# Patient Record
Sex: MALE | Race: WHITE | NOT HISPANIC OR LATINO | Employment: FULL TIME | URBAN - METROPOLITAN AREA
[De-identification: names, ages, dates, MRNs, and addresses within clinical notes are randomized per-mention and may not be internally consistent; named-entity substitution may affect disease eponyms.]

---

## 2024-04-22 ENCOUNTER — APPOINTMENT (EMERGENCY)
Dept: RADIOLOGY | Facility: HOSPITAL | Age: 64
End: 2024-04-22
Payer: COMMERCIAL

## 2024-04-22 ENCOUNTER — HOSPITAL ENCOUNTER (EMERGENCY)
Facility: HOSPITAL | Age: 64
Discharge: HOME | End: 2024-04-23
Attending: EMERGENCY MEDICINE
Payer: COMMERCIAL

## 2024-04-22 DIAGNOSIS — R07.9 CHEST PAIN: Primary | ICD-10-CM

## 2024-04-22 DIAGNOSIS — K04.7 DENTAL INFECTION: ICD-10-CM

## 2024-04-22 DIAGNOSIS — I10 HYPERTENSION: ICD-10-CM

## 2024-04-22 LAB
ALBUMIN SERPL BCP-MCNC: 4.6 G/DL (ref 3.5–5)
ALP SERPL-CCNC: 56 U/L (ref 34–104)
ALT SERPL W P-5'-P-CCNC: 19 U/L (ref 7–52)
ANION GAP SERPL CALCULATED.3IONS-SCNC: 11 MMOL/L (ref 4–13)
AST SERPL W P-5'-P-CCNC: 14 U/L (ref 13–39)
BASOPHILS # BLD AUTO: 0.04 THOUSANDS/ÂΜL (ref 0–0.1)
BASOPHILS NFR BLD AUTO: 0 % (ref 0–1)
BILIRUB SERPL-MCNC: 2.48 MG/DL (ref 0.2–1)
BNP SERPL-MCNC: 13 PG/ML (ref 0–100)
BUN SERPL-MCNC: 16 MG/DL (ref 5–25)
CALCIUM SERPL-MCNC: 9.5 MG/DL (ref 8.4–10.2)
CARDIAC TROPONIN I PNL SERPL HS: 3 NG/L
CHLORIDE SERPL-SCNC: 102 MMOL/L (ref 96–108)
CO2 SERPL-SCNC: 29 MMOL/L (ref 21–32)
CREAT SERPL-MCNC: 1.37 MG/DL (ref 0.6–1.3)
EOSINOPHIL # BLD AUTO: 0.1 THOUSAND/ÂΜL (ref 0–0.61)
EOSINOPHIL NFR BLD AUTO: 1 % (ref 0–6)
ERYTHROCYTE [DISTWIDTH] IN BLOOD BY AUTOMATED COUNT: 13.3 % (ref 11.6–15.1)
GFR SERPL CREATININE-BSD FRML MDRD: 54 ML/MIN/1.73SQ M
GLUCOSE SERPL-MCNC: 118 MG/DL (ref 65–140)
HCT VFR BLD AUTO: 49.4 % (ref 36.5–49.3)
HGB BLD-MCNC: 16.3 G/DL (ref 12–17)
IMM GRANULOCYTES # BLD AUTO: 0.04 THOUSAND/UL (ref 0–0.2)
IMM GRANULOCYTES NFR BLD AUTO: 0 % (ref 0–2)
LYMPHOCYTES # BLD AUTO: 2.26 THOUSANDS/ÂΜL (ref 0.6–4.47)
LYMPHOCYTES NFR BLD AUTO: 18 % (ref 14–44)
MAGNESIUM SERPL-MCNC: 2.4 MG/DL (ref 1.9–2.7)
MCH RBC QN AUTO: 30.2 PG (ref 26.8–34.3)
MCHC RBC AUTO-ENTMCNC: 33 G/DL (ref 31.4–37.4)
MCV RBC AUTO: 92 FL (ref 82–98)
MONOCYTES # BLD AUTO: 1.34 THOUSAND/ÂΜL (ref 0.17–1.22)
MONOCYTES NFR BLD AUTO: 11 % (ref 4–12)
NEUTROPHILS # BLD AUTO: 8.51 THOUSANDS/ÂΜL (ref 1.85–7.62)
NEUTS SEG NFR BLD AUTO: 70 % (ref 43–75)
NRBC BLD AUTO-RTO: 0 /100 WBCS
PLATELET # BLD AUTO: 227 THOUSANDS/UL (ref 149–390)
PMV BLD AUTO: 9.9 FL (ref 8.9–12.7)
POTASSIUM SERPL-SCNC: 3.6 MMOL/L (ref 3.5–5.3)
PROT SERPL-MCNC: 7.7 G/DL (ref 6.4–8.4)
RBC # BLD AUTO: 5.39 MILLION/UL (ref 3.88–5.62)
SODIUM SERPL-SCNC: 142 MMOL/L (ref 135–147)
WBC # BLD AUTO: 12.29 THOUSAND/UL (ref 4.31–10.16)

## 2024-04-22 PROCEDURE — 99285 EMERGENCY DEPT VISIT HI MDM: CPT

## 2024-04-22 PROCEDURE — 83735 ASSAY OF MAGNESIUM: CPT | Performed by: EMERGENCY MEDICINE

## 2024-04-22 PROCEDURE — 93005 ELECTROCARDIOGRAM TRACING: CPT

## 2024-04-22 PROCEDURE — 96374 THER/PROPH/DIAG INJ IV PUSH: CPT

## 2024-04-22 PROCEDURE — 84484 ASSAY OF TROPONIN QUANT: CPT | Performed by: EMERGENCY MEDICINE

## 2024-04-22 PROCEDURE — 83880 ASSAY OF NATRIURETIC PEPTIDE: CPT | Performed by: EMERGENCY MEDICINE

## 2024-04-22 PROCEDURE — 80053 COMPREHEN METABOLIC PANEL: CPT | Performed by: EMERGENCY MEDICINE

## 2024-04-22 PROCEDURE — 71045 X-RAY EXAM CHEST 1 VIEW: CPT

## 2024-04-22 PROCEDURE — 85025 COMPLETE CBC W/AUTO DIFF WBC: CPT | Performed by: EMERGENCY MEDICINE

## 2024-04-22 PROCEDURE — 96375 TX/PRO/DX INJ NEW DRUG ADDON: CPT

## 2024-04-22 PROCEDURE — 36415 COLL VENOUS BLD VENIPUNCTURE: CPT | Performed by: EMERGENCY MEDICINE

## 2024-04-22 RX ORDER — AMLODIPINE BESYLATE 5 MG/1
5 TABLET ORAL ONCE
Status: COMPLETED | OUTPATIENT
Start: 2024-04-22 | End: 2024-04-22

## 2024-04-22 RX ORDER — HYDROMORPHONE HCL/PF 1 MG/ML
0.5 SYRINGE (ML) INJECTION ONCE
Status: COMPLETED | OUTPATIENT
Start: 2024-04-22 | End: 2024-04-22

## 2024-04-22 RX ORDER — AMOXICILLIN 250 MG/1
500 CAPSULE ORAL ONCE
Status: COMPLETED | OUTPATIENT
Start: 2024-04-22 | End: 2024-04-22

## 2024-04-22 RX ORDER — MORPHINE SULFATE 4 MG/ML
4 INJECTION, SOLUTION INTRAMUSCULAR; INTRAVENOUS ONCE
Status: COMPLETED | OUTPATIENT
Start: 2024-04-22 | End: 2024-04-22

## 2024-04-22 RX ADMIN — HYDROMORPHONE HYDROCHLORIDE 0.5 MG: 1 INJECTION, SOLUTION INTRAMUSCULAR; INTRAVENOUS; SUBCUTANEOUS at 23:29

## 2024-04-22 RX ADMIN — AMLODIPINE BESYLATE 5 MG: 5 TABLET ORAL at 22:57

## 2024-04-22 RX ADMIN — AMOXICILLIN 500 MG: 250 CAPSULE ORAL at 22:57

## 2024-04-22 RX ADMIN — MORPHINE SULFATE 4 MG: 4 INJECTION INTRAVENOUS at 22:13

## 2024-04-23 VITALS
TEMPERATURE: 99.5 F | SYSTOLIC BLOOD PRESSURE: 200 MMHG | BODY MASS INDEX: 32.7 KG/M2 | OXYGEN SATURATION: 93 % | RESPIRATION RATE: 19 BRPM | WEIGHT: 208.34 LBS | HEART RATE: 102 BPM | DIASTOLIC BLOOD PRESSURE: 105 MMHG | HEIGHT: 67 IN

## 2024-04-23 LAB
2HR DELTA HS TROPONIN: 0 NG/L
ATRIAL RATE: 88 BPM
CARDIAC TROPONIN I PNL SERPL HS: 3 NG/L
P AXIS: 52 DEGREES
PR INTERVAL: 164 MS
QRS AXIS: -21 DEGREES
QRSD INTERVAL: 78 MS
QT INTERVAL: 372 MS
QTC INTERVAL: 450 MS
T WAVE AXIS: 43 DEGREES
VENTRICULAR RATE: 88 BPM

## 2024-04-23 PROCEDURE — 84484 ASSAY OF TROPONIN QUANT: CPT | Performed by: EMERGENCY MEDICINE

## 2024-04-23 PROCEDURE — 99285 EMERGENCY DEPT VISIT HI MDM: CPT | Performed by: EMERGENCY MEDICINE

## 2024-04-23 PROCEDURE — 93010 ELECTROCARDIOGRAM REPORT: CPT | Performed by: INTERNAL MEDICINE

## 2024-04-23 PROCEDURE — 36415 COLL VENOUS BLD VENIPUNCTURE: CPT | Performed by: EMERGENCY MEDICINE

## 2024-04-23 RX ORDER — AMLODIPINE BESYLATE 5 MG/1
5 TABLET ORAL DAILY
Qty: 30 TABLET | Refills: 0 | Status: SHIPPED | OUTPATIENT
Start: 2024-04-23 | End: 2024-05-23

## 2024-04-23 RX ORDER — AMOXICILLIN 500 MG/1
500 CAPSULE ORAL 3 TIMES DAILY
Qty: 21 CAPSULE | Refills: 0 | Status: SHIPPED | OUTPATIENT
Start: 2024-04-23 | End: 2024-04-30

## 2024-04-23 RX ORDER — DIPHENHYDRAMINE HYDROCHLORIDE AND LIDOCAINE HYDROCHLORIDE AND ALUMINUM HYDROXIDE AND MAGNESIUM HYDRO
10 KIT ONCE
Status: COMPLETED | OUTPATIENT
Start: 2024-04-23 | End: 2024-04-23

## 2024-04-23 RX ADMIN — DIPHENHYDRAMINE HYDROCHLORIDE AND LIDOCAINE HYDROCHLORIDE AND ALUMINUM HYDROXIDE AND MAGNESIUM HYDRO 10 ML: KIT at 01:07

## 2024-04-23 NOTE — ED NOTES
Pt states he takes 800mg Ibuprophen twice a day. Once in the morning and once at night.     Anay Patton RN  04/22/24 [id number]

## 2024-04-23 NOTE — ED NOTES
Spoke with provider regarding discharging patient with current blood pressures. Provider explained that since this has been long term and untreated, the blood pressure would need to resolve slowly over days and is sending patient home with medications to do so. Provider feels safe discharging patient at this time. Pt is asymptomatic and feels well enough to be discharged.      Anay Patton RN  04/23/24 0054

## 2024-04-23 NOTE — ED PROVIDER NOTES
History  Chief Complaint   Patient presents with    Chest Pain     Intermittent x days, nausea, L lower dental pain, had dental work done Friday in [place]     63-year-old male history of hypertension recently started on telmisartan presents with complaint of intermittent chest pain for the past week, high blood pressure, and dental pain.  Patient is a  and lives in [place].  He recently went to [place] with the intent of having dental work however when he arrived his blood pressure was Quite high so they refused to do any procedures on him and prescribed him a new blood pressure medicine.  Since then he continues to have dental pain affecting all of his right lower front teeth which has been consistently painful for some time related to tooth decay.  He denies prior courses of antibiotics.  No fevers or chills.  No new swelling.        None       Past Medical History:   Diagnosis Date    Hypertension        Past Surgical History:   Procedure Laterality Date    APPENDECTOMY      CHOLECYSTECTOMY         History reviewed. No pertinent family history.  I have reviewed and agree with the history as documented.    E-Cigarette/Vaping    E-Cigarette Use Never User      E-Cigarette/Vaping Substances     Social History     Tobacco Use    Smoking status: Former     Types: Cigarettes     Passive exposure: Never    Smokeless tobacco: Never   Vaping Use    Vaping status: Never Used   Substance Use Topics    Alcohol use: Not Currently    Drug use: Not Currently       Review of Systems   All other systems reviewed and are negative.      Physical Exam  Physical Exam  Constitutional:       General: He is not in acute distress.  HENT:      Mouth/Throat:      Mouth: Mucous membranes are moist.   Eyes:      Conjunctiva/sclera: Conjunctivae normal.   Cardiovascular:      Rate and Rhythm: Normal rate and regular rhythm.   Pulmonary:      Effort: Pulmonary effort is normal.      Breath sounds: Normal breath sounds.   Abdominal:       Palpations: Abdomen is soft.      Tenderness: There is no abdominal tenderness.   Musculoskeletal:      Right lower leg: No edema.      Left lower leg: No edema.   Skin:     General: Skin is warm and dry.   Neurological:      General: No focal deficit present.      Mental Status: He is alert and oriented to person, place, and time.   Psychiatric:         Mood and Affect: Mood normal.         Behavior: Behavior normal.         Vital Signs  ED Triage Vitals [04/22/24 2135]   Temperature Pulse Respirations Blood Pressure SpO2   99.3 °F (37.4 °C) 91 18 (!) 228/114 95 %      Temp Source Heart Rate Source Patient Position - Orthostatic VS BP Location FiO2 (%)   Oral Monitor Lying Left arm --      Pain Score       10 - Worst Possible Pain           Vitals:    04/22/24 2300 04/22/24 2315 04/22/24 2330 04/23/24 0000   BP: (!) 244/119 (!) 224/104 (!) 218/109 (!) 217/110   Pulse: 89 91 90 99   Patient Position - Orthostatic VS: Lying Lying Lying Lying         Visual Acuity      ED Medications  Medications   morphine injection 4 mg (4 mg Intravenous Given 4/22/24 2213)   amLODIPine (NORVASC) tablet 5 mg (5 mg Oral Given 4/22/24 2257)   amoxicillin (AMOXIL) capsule 500 mg (500 mg Oral Given 4/22/24 2257)   HYDROmorphone (DILAUDID) injection 0.5 mg (0.5 mg Intravenous Given 4/22/24 2329)       Diagnostic Studies  Results Reviewed       Procedure Component Value Units Date/Time    HS Troponin I 2hr [[id number]] Collected: 04/23/24 0000    Lab Status: In process Specimen: Blood from Arm, Right Updated: 04/23/24 0005    HS Troponin I 4hr [[id number]]     Lab Status: No result Specimen: Blood     B-Type Natriuretic Peptide(BNP) [[id number]]  (Normal) Collected: 04/22/24 2201    Lab Status: Final result Specimen: Blood from Arm, Right Updated: 04/22/24 2241     BNP 13 pg/mL     HS Troponin 0hr (reflex protocol) [[id number]]  (Normal) Collected: 04/22/24 2201    Lab Status: Final result Specimen: Blood from Arm, Right Updated: 04/22/24  2236     hs TnI 0hr 3 ng/L     Comprehensive metabolic panel [[id number]]  (Abnormal) Collected: 04/22/24 2201    Lab Status: Final result Specimen: Blood from Arm, Right Updated: 04/22/24 2231     Sodium 142 mmol/L      Potassium 3.6 mmol/L      Chloride 102 mmol/L      CO2 29 mmol/L      ANION GAP 11 mmol/L      BUN 16 mg/dL      Creatinine 1.37 mg/dL      Glucose 118 mg/dL      Calcium 9.5 mg/dL      AST 14 U/L      ALT 19 U/L      Alkaline Phosphatase 56 U/L      Total Protein 7.7 g/dL      Albumin 4.6 g/dL      Total Bilirubin 2.48 mg/dL      eGFR 54 ml/min/1.73sq m     Narrative:      [employer] guidelines for Chronic Kidney Disease (CKD):     Stage 1 with normal or high GFR (GFR > 90 mL/min/1.73 square meters)    Stage 2 Mild CKD (GFR = 60-89 mL/min/1.73 square meters)    Stage 3A Moderate CKD (GFR = 45-59 mL/min/1.73 square meters)    Stage 3B Moderate CKD (GFR = 30-44 mL/min/1.73 square meters)    Stage 4 Severe CKD (GFR = 15-29 mL/min/1.73 square meters)    Stage 5 End Stage CKD (GFR <15 mL/min/1.73 square meters)  Note: GFR calculation is accurate only with a steady state creatinine    Magnesium [[id number]]  (Normal) Collected: 04/22/24 2201    Lab Status: Final result Specimen: Blood from Arm, Right Updated: 04/22/24 2231     Magnesium 2.4 mg/dL     CBC and differential [[id number]]  (Abnormal) Collected: 04/22/24 2201    Lab Status: Final result Specimen: Blood from Arm, Right Updated: 04/22/24 2214     WBC 12.29 Thousand/uL      RBC 5.39 Million/uL      Hemoglobin 16.3 g/dL      Hematocrit 49.4 %      MCV 92 fL      MCH 30.2 pg      MCHC 33.0 g/dL      RDW 13.3 %      MPV 9.9 fL      Platelets 227 Thousands/uL      nRBC 0 /100 WBCs      Segmented % 70 %      Immature Grans % 0 %      Lymphocytes % 18 %      Monocytes % 11 %      Eosinophils Relative 1 %      Basophils Relative 0 %      Absolute Neutrophils 8.51 Thousands/µL      Absolute Immature Grans 0.04 Thousand/uL       Absolute Lymphocytes 2.26 Thousands/µL      Absolute Monocytes 1.34 Thousand/µL      Eosinophils Absolute 0.10 Thousand/µL      Basophils Absolute 0.04 Thousands/µL                    XR chest 1 view portable    (Results Pending)              Procedures  Procedures         ED Course         63-year-old male history of hypertension presenting with subacute dental pain no evidence of abscess on exam.  No acute progression.  Suspect that this is unrelated to patient's chest pain and hypertension.  Will prescribe a short course of amoxicillin.  Advised to repeat dental follow-up hold blood pressure is better controlled to allow for intervention.  Patient is meeting SIRS criteria however do not suspect a systemic infectious process at this time.  Patient also complaining of intermittent chest pain.  He is markedly hypertensive on exam with otherwise normal vitals.  Initial EKG shows normal sinus rhythm 88 bpm with poor R wave progression no acute ST or T wave abnormalities to indicate ischemia per my independent interpretation.  Chest x-ray shows no acute disease per my independent interpretation.  Screening labs notable for nonspecific leukocytosis, BMP with slightly elevated creatinine of unclear baseline possibly related to chronic uncontrolled hypertension.  Initial troponin of 3.  2-hour troponin of 3.  This adequately rules out ACS or significant ongoing cardiac strain and hypertensive emergency.  Patient was given narcotic pain medications in the ED for relief of pain and also given an initial dose of amlodipine.  Will give patient a 1 month course of amlodipine to take in addition to his telmisartan.  He was advised to establish care with PCP promptly when he returns home to [place] for recheck of his blood pressure and likely addition of more blood pressure medicines to gradually achieve better BP control.  Abrupt reduction of blood pressure is not supported at this time due to lack of evidence of ongoing endorgan  damage.                                    Medical Decision Making  Amount and/or Complexity of Data Reviewed  Labs: ordered.  Radiology: ordered.    Risk  Prescription drug management.             Disposition  Final diagnoses:   Chest pain   Hypertension   Dental infection     Time reflects when diagnosis was documented in both MDM as applicable and the Disposition within this note       Time User Action Codes Description Comment    4/23/2024 12:08 AM Sweta Hdez [R07.9] Chest pain     4/23/2024 12:09 AM Sweta Hdez [I10] Hypertension     4/23/2024 12:09 AM Sweta Hdez [K04.7] Dental infection           ED Disposition       ED Disposition   Discharge    Condition   Stable    Date/Time   Tue Apr 23, 2024 12:08 AM    Comment   Nick Bauer discharge to home/self care.                   Follow-up Information    None         Patient's Medications   Discharge Prescriptions    AMLODIPINE (NORVASC) 5 MG TABLET    Take 1 tablet (5 mg total) by mouth daily       Start Date: 4/23/2024 End Date: 5/23/2024       Order Dose: 5 mg       Quantity: 30 tablet    Refills: 0    AMOXICILLIN (AMOXIL) 500 MG CAPSULE    Take 1 capsule (500 mg total) by mouth 3 (three) times a day for 7 days       Start Date: 4/23/2024 End Date: 4/30/2024       Order Dose: 500 mg       Quantity: 21 capsule    Refills: 0       No discharge procedures on file.    PDMP Review       None            ED Provider  Electronically Signed by             Sweta Hdez MD  04/23/24 8730

## 2024-04-23 NOTE — DISCHARGE INSTRUCTIONS
It is important that you establish care with a primary care doctor when you return home.  You will need someone to recheck your blood pressure to determine whether it is adequately controlled on the 2 medications you will now be taking.  You will also need to see a dentist once your blood pressure is better controlled.